# Patient Record
Sex: MALE | Race: WHITE
[De-identification: names, ages, dates, MRNs, and addresses within clinical notes are randomized per-mention and may not be internally consistent; named-entity substitution may affect disease eponyms.]

---

## 2019-01-10 PROBLEM — Z00.00 ENCOUNTER FOR PREVENTIVE HEALTH EXAMINATION: Status: ACTIVE | Noted: 2019-01-10

## 2019-01-11 ENCOUNTER — APPOINTMENT (OUTPATIENT)
Dept: ORTHOPEDIC SURGERY | Facility: CLINIC | Age: 39
End: 2019-01-11
Payer: COMMERCIAL

## 2019-01-11 VITALS — WEIGHT: 160 LBS | HEIGHT: 69 IN | BODY MASS INDEX: 23.7 KG/M2

## 2019-01-11 DIAGNOSIS — Z78.9 OTHER SPECIFIED HEALTH STATUS: ICD-10-CM

## 2019-01-11 DIAGNOSIS — S63.634A SPRAIN OF INTERPHALANGEAL JOINT OF RIGHT RING FINGER, INITIAL ENCOUNTER: ICD-10-CM

## 2019-01-11 PROCEDURE — 99203 OFFICE O/P NEW LOW 30 MIN: CPT

## 2019-01-11 PROCEDURE — 73120 X-RAY EXAM OF HAND: CPT | Mod: RT

## 2019-01-11 NOTE — PHYSICAL EXAM
[Normal] : normal [UE] : Sensory: Intact in bilateral upper extremities [Bicep] : biceps 2+ and symmetric bilaterally [Rad] : radial 2+ and symmetric bilaterally [Acute Distress] : not in acute distress [FreeTextEntry2] : Pain on palpation\par right PIP joint ring\par left no\par ROM\par rigth ring full but stiff\par left full\par Strength\par right 5/5\par left 4/5 finger\par Skin intact

## 2019-01-11 NOTE — HISTORY OF PRESENT ILLNESS
[Joint Stiffness] : joint stiffness [Muscle Weakness] : muscle weakness [Past Hx] : past medical [Fam Hx] : family [Soc Hx] : social [All Hx] : past medical, family, and social [Meds] : medication [Allergies] : allergy [All] : medication and allergy [Chills] : no chills [Discharge] : no discharge [Dec Hearing] : no hearing loss [Chest Pain] : no chest pain [Dyspnea] : no shortness of breath [Abdominal Pain] : no abdominal pain [Vomiting] : no vomiting [Pelvic Pain] : no pelvic pain [Frequency] : no frequency [Arthralgias] : no arthralgias [Breast Pain] : no breast pain [Headache] : no headache [Anxiety] : no anxiety [Deepening Voice] : no deepening of the voice [Easy Bleeding] : does not bleed easily [FreeTextEntry1] : Right Finger Pain [FreeTextEntry2] : Patient reports mild pain and swelling in right 4th finger for two months.  Patient jammed finger playing basketball.  Pain used Ice, compression and Aleve which had little improvement.  No triggering.

## 2019-06-24 ENCOUNTER — APPOINTMENT (OUTPATIENT)
Dept: ORTHOPEDIC SURGERY | Facility: CLINIC | Age: 39
End: 2019-06-24
Payer: COMMERCIAL

## 2019-06-24 VITALS — HEIGHT: 69 IN | WEIGHT: 160 LBS | BODY MASS INDEX: 23.7 KG/M2

## 2019-06-24 DIAGNOSIS — S53.422A: ICD-10-CM

## 2019-06-24 PROCEDURE — 73070 X-RAY EXAM OF ELBOW: CPT | Mod: LT

## 2019-06-24 PROCEDURE — 99214 OFFICE O/P EST MOD 30 MIN: CPT

## 2019-06-24 NOTE — PHYSICAL EXAM
[Normal] : Gait: normal [UE] : Sensory: Intact in bilateral upper extremities [Bicep] : biceps 2+ and symmetric bilaterally [Rad] : radial 2+ and symmetric bilaterally [Elbow Instability Laxity Left Medial] : positive Valgus Stress test [FreeTextEntry2] : Pain on palpation\par right no\par left UCL\par ROM\par right full\par left full with pain terminal extension\par Strength\par right 5/5\par left 4/5\par Skin intact

## 2019-06-24 NOTE — HISTORY OF PRESENT ILLNESS
[de-identified] : Patient reports moderate to severe medial left elbow pain for one month.  Patient dove to his left playing softball one month ago.  Pain has increased to constant pain.  No numbness or tingling.  Pain and stiffness at night and in the morning,  pain with full flexion, extension and rotation.  No NSAID use or treatment for this problem.

## 2019-07-09 ENCOUNTER — RESULT REVIEW (OUTPATIENT)
Age: 39
End: 2019-07-09